# Patient Record
Sex: MALE
[De-identification: names, ages, dates, MRNs, and addresses within clinical notes are randomized per-mention and may not be internally consistent; named-entity substitution may affect disease eponyms.]

---

## 2017-04-22 ENCOUNTER — TRANSCRIPTION ENCOUNTER (OUTPATIENT)
Age: 67
End: 2017-04-22

## 2017-07-26 ENCOUNTER — APPOINTMENT (OUTPATIENT)
Dept: CT IMAGING | Facility: CLINIC | Age: 67
End: 2017-07-26

## 2017-07-26 ENCOUNTER — OUTPATIENT (OUTPATIENT)
Dept: OUTPATIENT SERVICES | Facility: HOSPITAL | Age: 67
LOS: 1 days | End: 2017-07-26
Payer: MEDICARE

## 2017-07-26 DIAGNOSIS — Z00.8 ENCOUNTER FOR OTHER GENERAL EXAMINATION: ICD-10-CM

## 2017-07-26 PROCEDURE — G0297: CPT

## 2018-07-30 ENCOUNTER — OUTPATIENT (OUTPATIENT)
Dept: OUTPATIENT SERVICES | Facility: HOSPITAL | Age: 68
LOS: 1 days | End: 2018-07-30
Payer: MEDICARE

## 2018-07-30 ENCOUNTER — APPOINTMENT (OUTPATIENT)
Dept: CT IMAGING | Facility: CLINIC | Age: 68
End: 2018-07-30

## 2018-07-30 DIAGNOSIS — Z00.8 ENCOUNTER FOR OTHER GENERAL EXAMINATION: ICD-10-CM

## 2018-07-30 PROCEDURE — G0297: CPT

## 2018-07-30 PROCEDURE — G0297: CPT | Mod: 26

## 2019-08-06 VITALS — BODY MASS INDEX: 36.1 KG/M2 | HEIGHT: 67 IN | WEIGHT: 230 LBS

## 2019-08-14 ENCOUNTER — APPOINTMENT (OUTPATIENT)
Dept: CT IMAGING | Facility: CLINIC | Age: 69
End: 2019-08-14
Payer: MEDICARE

## 2019-08-14 ENCOUNTER — OUTPATIENT (OUTPATIENT)
Dept: OUTPATIENT SERVICES | Facility: HOSPITAL | Age: 69
LOS: 1 days | End: 2019-08-14
Payer: MEDICARE

## 2019-08-14 DIAGNOSIS — Z00.8 ENCOUNTER FOR OTHER GENERAL EXAMINATION: ICD-10-CM

## 2019-08-14 PROCEDURE — G0297: CPT | Mod: 26

## 2019-08-14 PROCEDURE — G0297: CPT

## 2020-09-10 VITALS — HEIGHT: 67 IN | WEIGHT: 225 LBS | BODY MASS INDEX: 35.31 KG/M2

## 2020-09-10 NOTE — HISTORY OF PRESENT ILLNESS
[TextBox_13] : Referred by Dr. Seng Myles.\par \par Mr. WARE is a 70 year old male with a history of barretts esophagus, COPD and emphysema.\par \par He was called to review eligibility for Low-Dose CT lung cancer screening.  Reviewed and confirmed that the patient meets screening eligibility criteria:\par \par 70 years old \par \par Smoking Status: Former smoker\par \par Number of pack(s) per day: 2\par Number of years smoked: 41\par Number of pack years smokin\par \par Number of years since quitting smokin\par Quit year: \par \par Mr. WARE denies any symptoms of lung cancer, including new cough, change in cough, hemoptysis, and unintentional weight loss.\par \par Mr. WARE denies any personal history of lung cancer.  No lung cancer in a first degree relative.

## 2020-09-10 NOTE — PLAN
[FreeTextEntry1] : - Low Dose CT chest for lung cancer screening.\par \par - Encouraged continued smoking abstinence\par

## 2020-09-10 NOTE — DATA REVIEWED
[Lung Cancer Screening] : Patient underwent lung cancer screening [2] : 2 [TextBox_12] : 7/16 [TextBox_27] : 7/17 [TextBox_42] : 8/19

## 2020-09-14 ENCOUNTER — OUTPATIENT (OUTPATIENT)
Dept: OUTPATIENT SERVICES | Facility: HOSPITAL | Age: 70
LOS: 1 days | End: 2020-09-14
Payer: MEDICARE

## 2020-09-14 ENCOUNTER — APPOINTMENT (OUTPATIENT)
Dept: CT IMAGING | Facility: CLINIC | Age: 70
End: 2020-09-14
Payer: MEDICARE

## 2020-09-14 DIAGNOSIS — Z00.8 ENCOUNTER FOR OTHER GENERAL EXAMINATION: ICD-10-CM

## 2020-09-14 PROCEDURE — G0297: CPT

## 2020-09-14 PROCEDURE — G0297: CPT | Mod: 26

## 2021-06-02 ENCOUNTER — APPOINTMENT (OUTPATIENT)
Dept: ORTHOPEDIC SURGERY | Facility: CLINIC | Age: 71
End: 2021-06-02

## 2021-10-14 ENCOUNTER — RESULT REVIEW (OUTPATIENT)
Age: 71
End: 2021-10-14

## 2023-08-14 ENCOUNTER — NON-APPOINTMENT (OUTPATIENT)
Age: 73
End: 2023-08-14

## 2023-08-14 VITALS — HEIGHT: 67 IN | WEIGHT: 225 LBS | BODY MASS INDEX: 35.31 KG/M2

## 2023-08-14 NOTE — HISTORY OF PRESENT ILLNESS
[Former] : Former [TextBox_13] : Referred by Dr. Seng Myles. Mr. WARE is a 73 year old male with a history of barretts esophagus, COPD and emphysema. Reviewed and confirmed that the patient meets screening eligibility criteria: 73 years old Smoking Status: Former smoker Number of pack(s) per day: 2 Number of years smoked: 41 Number of pack years smokin Quit year:  No symptoms of lung cancer, including new cough, change in cough, hemoptysis, and unintentional weight loss. No personal history of lung cancer. No lung cancer in a first degree relative. [YearQuit] : 2008 [PacksperYear] : 82

## 2023-08-28 ENCOUNTER — OUTPATIENT (OUTPATIENT)
Dept: OUTPATIENT SERVICES | Facility: HOSPITAL | Age: 73
LOS: 1 days | End: 2023-08-28
Payer: MEDICARE

## 2023-08-28 ENCOUNTER — APPOINTMENT (OUTPATIENT)
Dept: CT IMAGING | Facility: CLINIC | Age: 73
End: 2023-08-28
Payer: MEDICARE

## 2023-08-28 DIAGNOSIS — Z00.8 ENCOUNTER FOR OTHER GENERAL EXAMINATION: ICD-10-CM

## 2023-08-28 PROCEDURE — 71271 CT THORAX LUNG CANCER SCR C-: CPT

## 2023-08-28 PROCEDURE — 71271 CT THORAX LUNG CANCER SCR C-: CPT | Mod: 26

## 2024-01-23 ENCOUNTER — APPOINTMENT (OUTPATIENT)
Dept: MRI IMAGING | Facility: CLINIC | Age: 74
End: 2024-01-23
Payer: MEDICARE

## 2024-01-23 PROCEDURE — 70551 MRI BRAIN STEM W/O DYE: CPT | Mod: MH

## 2024-02-05 ENCOUNTER — APPOINTMENT (OUTPATIENT)
Dept: MRI IMAGING | Facility: CLINIC | Age: 74
End: 2024-02-05

## 2024-02-09 ENCOUNTER — APPOINTMENT (OUTPATIENT)
Dept: MRI IMAGING | Facility: CLINIC | Age: 74
End: 2024-02-09
Payer: MEDICARE

## 2024-02-09 PROCEDURE — 70544 MR ANGIOGRAPHY HEAD W/O DYE: CPT | Mod: MH

## 2024-03-08 ENCOUNTER — OUTPATIENT (OUTPATIENT)
Dept: OUTPATIENT SERVICES | Facility: HOSPITAL | Age: 74
LOS: 1 days | End: 2024-03-08
Payer: MEDICARE

## 2024-03-08 ENCOUNTER — RESULT REVIEW (OUTPATIENT)
Age: 74
End: 2024-03-08

## 2024-03-08 ENCOUNTER — APPOINTMENT (OUTPATIENT)
Dept: CT IMAGING | Facility: CLINIC | Age: 74
End: 2024-03-08
Payer: MEDICARE

## 2024-03-08 DIAGNOSIS — R10.30 LOWER ABDOMINAL PAIN, UNSPECIFIED: ICD-10-CM

## 2024-03-08 PROCEDURE — 74177 CT ABD & PELVIS W/CONTRAST: CPT | Mod: MH

## 2024-03-08 PROCEDURE — 74177 CT ABD & PELVIS W/CONTRAST: CPT | Mod: 26,MH

## 2024-03-12 ENCOUNTER — OUTPATIENT (OUTPATIENT)
Dept: OUTPATIENT SERVICES | Facility: HOSPITAL | Age: 74
LOS: 1 days | Discharge: HOME | End: 2024-03-12

## 2024-03-12 DIAGNOSIS — R59.0 LOCALIZED ENLARGED LYMPH NODES: ICD-10-CM

## 2024-03-14 ENCOUNTER — RESULT REVIEW (OUTPATIENT)
Age: 74
End: 2024-03-14

## 2024-03-14 ENCOUNTER — APPOINTMENT (OUTPATIENT)
Dept: HEMATOLOGY ONCOLOGY | Facility: CLINIC | Age: 74
End: 2024-03-14
Payer: MEDICARE

## 2024-03-14 ENCOUNTER — NON-APPOINTMENT (OUTPATIENT)
Age: 74
End: 2024-03-14

## 2024-03-14 VITALS
SYSTOLIC BLOOD PRESSURE: 137 MMHG | OXYGEN SATURATION: 95 % | TEMPERATURE: 98.2 F | HEART RATE: 77 BPM | HEIGHT: 67 IN | WEIGHT: 258.67 LBS | BODY MASS INDEX: 40.6 KG/M2 | DIASTOLIC BLOOD PRESSURE: 88 MMHG | RESPIRATION RATE: 16 BRPM

## 2024-03-14 DIAGNOSIS — J44.9 CHRONIC OBSTRUCTIVE PULMONARY DISEASE, UNSPECIFIED: ICD-10-CM

## 2024-03-14 DIAGNOSIS — I10 ESSENTIAL (PRIMARY) HYPERTENSION: ICD-10-CM

## 2024-03-14 DIAGNOSIS — K57.90 DIVERTICULOSIS OF INTESTINE, PART UNSPECIFIED, W/OUT PERFORATION OR ABSCESS W/OUT BLEEDING: ICD-10-CM

## 2024-03-14 DIAGNOSIS — Z78.9 OTHER SPECIFIED HEALTH STATUS: ICD-10-CM

## 2024-03-14 DIAGNOSIS — E78.00 PURE HYPERCHOLESTEROLEMIA, UNSPECIFIED: ICD-10-CM

## 2024-03-14 DIAGNOSIS — I73.9 PERIPHERAL VASCULAR DISEASE, UNSPECIFIED: ICD-10-CM

## 2024-03-14 DIAGNOSIS — N40.0 BENIGN PROSTATIC HYPERPLASIA WITHOUT LOWER URINARY TRACT SYMPMS: ICD-10-CM

## 2024-03-14 DIAGNOSIS — R59.0 LOCALIZED ENLARGED LYMPH NODES: ICD-10-CM

## 2024-03-14 DIAGNOSIS — Z80.8 FAMILY HISTORY OF MALIGNANT NEOPLASM OF OTHER ORGANS OR SYSTEMS: ICD-10-CM

## 2024-03-14 DIAGNOSIS — K22.70 BARRETT'S ESOPHAGUS W/OUT DYSPLASIA: ICD-10-CM

## 2024-03-14 DIAGNOSIS — Z80.9 FAMILY HISTORY OF MALIGNANT NEOPLASM, UNSPECIFIED: ICD-10-CM

## 2024-03-14 DIAGNOSIS — Z87.891 PERSONAL HISTORY OF NICOTINE DEPENDENCE: ICD-10-CM

## 2024-03-14 LAB
ALBUMIN SERPL ELPH-MCNC: 4.2 G/DL
ALP BLD-CCNC: 55 U/L
ALT SERPL-CCNC: 35 U/L
ANION GAP SERPL CALC-SCNC: 15 MMOL/L
APTT BLD: 32.7 SEC
AST SERPL-CCNC: 33 U/L
BASOPHILS # BLD AUTO: 0.09 K/UL — SIGNIFICANT CHANGE UP (ref 0–0.2)
BASOPHILS NFR BLD AUTO: 0.8 % — SIGNIFICANT CHANGE UP (ref 0–2)
BILIRUB SERPL-MCNC: 0.5 MG/DL
BUN SERPL-MCNC: 17 MG/DL
CALCIUM SERPL-MCNC: 9.6 MG/DL
CHLORIDE SERPL-SCNC: 100 MMOL/L
CO2 SERPL-SCNC: 26 MMOL/L
CREAT SERPL-MCNC: 1.16 MG/DL
EGFR: 67 ML/MIN/1.73M2
EOSINOPHIL # BLD AUTO: 0.57 K/UL — HIGH (ref 0–0.5)
EOSINOPHIL NFR BLD AUTO: 5.3 % — SIGNIFICANT CHANGE UP (ref 0–6)
GLUCOSE SERPL-MCNC: 104 MG/DL
HCT VFR BLD CALC: 46.9 % — SIGNIFICANT CHANGE UP (ref 39–50)
HGB BLD-MCNC: 15.6 G/DL — SIGNIFICANT CHANGE UP (ref 13–17)
IMM GRANULOCYTES NFR BLD AUTO: 0.3 % — SIGNIFICANT CHANGE UP (ref 0–0.9)
INR PPP: 1.1 RATIO
LYMPHOCYTES # BLD AUTO: 3.43 K/UL — HIGH (ref 1–3.3)
LYMPHOCYTES # BLD AUTO: 31.6 % — SIGNIFICANT CHANGE UP (ref 13–44)
MCHC RBC-ENTMCNC: 31.6 PG — SIGNIFICANT CHANGE UP (ref 27–34)
MCHC RBC-ENTMCNC: 33.3 G/DL — SIGNIFICANT CHANGE UP (ref 32–36)
MCV RBC AUTO: 95.1 FL — SIGNIFICANT CHANGE UP (ref 80–100)
MONOCYTES # BLD AUTO: 1.27 K/UL — HIGH (ref 0–0.9)
MONOCYTES NFR BLD AUTO: 11.7 % — SIGNIFICANT CHANGE UP (ref 2–14)
NEUTROPHILS # BLD AUTO: 5.45 K/UL — SIGNIFICANT CHANGE UP (ref 1.8–7.4)
NEUTROPHILS NFR BLD AUTO: 50.3 % — SIGNIFICANT CHANGE UP (ref 43–77)
NRBC # BLD: 0 /100 WBCS — SIGNIFICANT CHANGE UP (ref 0–0)
PLATELET # BLD AUTO: 273 K/UL — SIGNIFICANT CHANGE UP (ref 150–400)
POTASSIUM SERPL-SCNC: 4.7 MMOL/L
PROT SERPL-MCNC: 6.9 G/DL
PSA FREE FLD-MCNC: 38 %
PSA FREE SERPL-MCNC: 0.45 NG/ML
PSA SERPL-MCNC: 1.19 NG/ML
PT BLD: 12.4 SEC
RBC # BLD: 4.93 M/UL — SIGNIFICANT CHANGE UP (ref 4.2–5.8)
RBC # FLD: 13.6 % — SIGNIFICANT CHANGE UP (ref 10.3–14.5)
SODIUM SERPL-SCNC: 140 MMOL/L
WBC # BLD: 10.84 K/UL — HIGH (ref 3.8–10.5)
WBC # FLD AUTO: 10.84 K/UL — HIGH (ref 3.8–10.5)

## 2024-03-14 PROCEDURE — 99205 OFFICE O/P NEW HI 60 MIN: CPT

## 2024-03-14 RX ORDER — TAMSULOSIN HYDROCHLORIDE 0.4 MG/1
0.4 CAPSULE ORAL
Qty: 30 | Refills: 5 | Status: ACTIVE | COMMUNITY
Start: 2024-03-14

## 2024-03-14 RX ORDER — ESOMEPRAZOLE MAGNESIUM 20 MG/1
20 CAPSULE, DELAYED RELEASE ORAL DAILY
Refills: 0 | Status: ACTIVE | COMMUNITY
Start: 2024-03-14

## 2024-03-14 RX ORDER — METRONIDAZOLE 500 MG/1
500 TABLET ORAL TWICE DAILY
Qty: 28 | Refills: 0 | Status: ACTIVE | COMMUNITY
Start: 2024-03-14

## 2024-03-14 RX ORDER — BUDESONIDE, GLYCOPYRROLATE, AND FORMOTEROL FUMARATE 160; 9; 4.8 UG/1; UG/1; UG/1
160-9-4.8 AEROSOL, METERED RESPIRATORY (INHALATION) DAILY
Refills: 0 | Status: ACTIVE | COMMUNITY
Start: 2024-03-14

## 2024-03-14 RX ORDER — ATORVASTATIN CALCIUM 10 MG/1
10 TABLET, FILM COATED ORAL
Refills: 0 | Status: ACTIVE | COMMUNITY
Start: 2024-03-14

## 2024-03-14 RX ORDER — CIPROFLOXACIN HYDROCHLORIDE 500 MG/1
500 TABLET, FILM COATED ORAL DAILY
Qty: 7 | Refills: 0 | Status: ACTIVE | COMMUNITY
Start: 2024-03-14

## 2024-03-14 RX ORDER — HYDROCHLOROTHIAZIDE 25 MG/1
25 TABLET ORAL
Refills: 0 | Status: ACTIVE | COMMUNITY
Start: 2024-03-14

## 2024-03-14 NOTE — ASSESSMENT
[Palliative Care Plan] : not applicable at this time [Supportive] : Goals of care discussed with patient: Supportive [FreeTextEntry1] : FAYE Gupta is a 73-year-old male referred by physician assistant to Dr Eduardo Allen after CT scan finding reported an enlarged left iliac chain lymph node and mild enlargement of at least two smaller para-aortic lymph nodes. The CT imaging physician wrote a recommendation for oncology evaluation. The patient had a long history of bilateral lower extremity arterial insufficiency and chronic (at least two years) cellulitis with ulceration in bilateral pretibial region. He does not have fever, chills or weight loss at this time. The physical examination shows no peripheral palpable lymphadenopathy. He has no palpable liver or spleen, and the size of these organs are normal on CT scan. CT scan does show an enlarged prostate and he has seen  every 6 months with normal PSA and normal pelvic US (of bladder) We discussed the use of diet and dietary therapy in the management of Crohn's Disease. In order to safely and effectively implement the dietary intervention, follow up with our dietician team was recommended. By his history, a lymphoma diagnosis is unlikely although low grade lymphoma might present in this age group. Lymphadenopathy in this region may exist with prostate disease, rectal tumors (not seen on imaging or on his rectal examinations) skin carcinoma pf lower extremity-not seen on today's examination; nor is there a history; and chronic cellulitis,  The chronic cellulitis is the likely diagnosis of a cause of the lymphadenopathy. I will ask for a US guided biopsy of the iliac lymph node. Blood testing requested today (no [facility] blood testing in [employer]) CBC CMP aPTT and PT. I shall request US guided biopsy RTC in one month to see Cherelle RAMIREZ

## 2024-03-14 NOTE — REASON FOR VISIT
[Initial Consultation] : an initial consultation for [Blood Count Assessment] : blood count assessment [Other: _____] : [unfilled] [Spouse] : spouse [FreeTextEntry2] : lymphadenopathy

## 2024-03-14 NOTE — PHYSICAL EXAM
[Restricted in physically strenuous activity but ambulatory and able to carry out work of a light or sedentary nature] : Status 1- Restricted in physically strenuous activity but ambulatory and able to carry out work of a light or sedentary nature, e.g., light house work, office work [Obese] : obese [Normal] : affect appropriate [de-identified] : edentulous [de-identified] : small catarracts [de-identified] : no palpable cervical supraclavicular axillary or inguinal lymph nodes [de-identified] : obese; liver edge non palpable; normal liver dullness and no palpable spleen.

## 2024-03-14 NOTE — HISTORY OF PRESENT ILLNESS
[90: Able to carry normal activity; minor signs or symptoms of disease.] : 90: Able to carry normal activity; minor signs or symptoms of disease.  [ECOG Performance Status: 1 - Restricted in physically strenuous activity but ambulatory and able to carry out work of a light or sedentary nature] : Performance Status: 1 - Restricted in physically strenuous activity but ambulatory and able to carry out work of a light or sedentary nature, e.g., light house work, office work [Disease:__________________________] : Disease: [unfilled] [Date: ____________] : Patient's last distress assessment performed on [unfilled]. [5 - Distress Level] : Distress Level: 5 [de-identified] : Bobby Ashley is a 73-year-old male with a recent CT scan performed and resulted to show mild lymphadenopathy in the paraaortic and left iliac region. He has a 3-year history of hypertension chronic bilateral lower extremity arterial insufficiency and chronic bilateral leg ulceration.  He was treated at the wound care center. Dr Eduardo Allen gastroenterology addresses endoscopy for Hemanth. ASHLEY Ellis: the patient had a history of hepatitis at age 21. PCP Payton Hawthorne for general physical needs Dr SRIKANTH Bonilla ([facility]; [facility] [place]) treating hypertension and cholesterol since 2018 with associated increase in weight. He is not on anticoagulation. Dr ANABEL Yusuf urology: follow enlarged prostate. He believes that he has a normal PSA; no prostate biopsy; seen every six months since 2014; he receives US of Bladder last US in July 2023. Dr [facility] [place] bilateral lower extremity under treatment from 202o though. 2023; poor circulation. He was not a candidate vein surgery. Three years of antibiotic therapy.  Dr GEORGE Myles pulmonary ([facility]) treats for COPD. There is no history of tuberculosis. There is no history of cancer, no history no chemotherapy or radiation therapy. [de-identified] : see HPI

## 2024-03-14 NOTE — REVIEW OF SYSTEMS
[Wheezing] : wheezing [Shortness Of Breath] : shortness of breath [Cough] : cough [SOB on Exertion] : shortness of breath during exertion [Vision Problems] : vision problems [Diarrhea: Grade 0] : Diarrhea: Grade 0 [Joint Pain] : joint pain [Joint Stiffness] : joint stiffness [Skin Wound] : skin wound [Anxiety] : anxiety [Negative] : Allergic/Immunologic [Chills] : no chills [Fever] : no fever [Fatigue] : no fatigue [Night Sweats] : no night sweats [Eye Pain] : no eye pain [Recent Change In Weight] : ~T no recent weight change [Red Eyes] : eyes not red [Abdominal Pain] : no abdominal pain [Dry Eyes] : no dryness of the eyes [Constipation] : no constipation [Vomiting] : no vomiting [Dysuria] : no dysuria [Incontinence] : no incontinence [Muscle Pain] : no muscle pain [Fainting] : no fainting [Dizziness] : no dizziness [Confused] : no confusion [Difficulty Walking] : no difficulty walking [Suicidal] : not suicidal [Insomnia] : no insomnia [Depression] : no depression [Hot Flashes] : no hot flashes [Proptosis] : no proptosis [Muscle Weakness] : no muscle weakness [Deepening Of The Voice] : no deepening of the voice [Easy Bruising] : no tendency for easy bruising [Easy Bleeding] : no tendency for easy bleeding [FreeTextEntry2] : long term obesity limiting activity [FreeTextEntry4] : full dental extractions for gingivitis [FreeTextEntry6] : COPD 2014 [FreeTextEntry8] : decreased urine stream [de-identified] : healed bilateral lower extremity healed ulcers

## 2024-03-15 ENCOUNTER — APPOINTMENT (OUTPATIENT)
Dept: MRI IMAGING | Facility: CLINIC | Age: 74
End: 2024-03-15

## 2024-04-17 ENCOUNTER — APPOINTMENT (OUTPATIENT)
Dept: HEMATOLOGY ONCOLOGY | Facility: CLINIC | Age: 74
End: 2024-04-17

## 2024-05-08 ENCOUNTER — APPOINTMENT (OUTPATIENT)
Dept: ULTRASOUND IMAGING | Facility: IMAGING CENTER | Age: 74
End: 2024-05-08

## 2024-06-06 ENCOUNTER — RESULT REVIEW (OUTPATIENT)
Age: 74
End: 2024-06-06

## 2024-08-19 ENCOUNTER — NON-APPOINTMENT (OUTPATIENT)
Age: 74
End: 2024-08-19

## 2024-08-20 VITALS — WEIGHT: 258 LBS | BODY MASS INDEX: 40.49 KG/M2 | HEIGHT: 67 IN

## 2024-08-20 DIAGNOSIS — Z87.891 PERSONAL HISTORY OF NICOTINE DEPENDENCE: ICD-10-CM

## 2024-08-20 NOTE — HISTORY OF PRESENT ILLNESS
[Former] : Former [TextBox_13] : Referred by Dr. Seng Myles. Mr. WARE is a 74 year old male with a history of barretts esophagus, COPD and emphysema. Reviewed and confirmed that the patient meets screening eligibility criteria: 74 years old Smoking Status: Former smoker Number of pack(s) per day: 2 Number of years smoked: 41 Number of pack years smokin Quit year:  No symptoms of lung cancer, including new cough, change in cough, hemoptysis, and unintentional weight loss. No personal history of lung cancer. No lung cancer in a first degree relative. [YearQuit] : 2011 [PacksperYear] : 82

## 2024-09-04 ENCOUNTER — OUTPATIENT (OUTPATIENT)
Dept: OUTPATIENT SERVICES | Facility: HOSPITAL | Age: 74
LOS: 1 days | End: 2024-09-04
Payer: MEDICARE

## 2024-09-04 ENCOUNTER — APPOINTMENT (OUTPATIENT)
Dept: CT IMAGING | Facility: CLINIC | Age: 74
End: 2024-09-04

## 2024-09-04 DIAGNOSIS — R91.1 SOLITARY PULMONARY NODULE: ICD-10-CM

## 2024-09-04 PROCEDURE — 71271 CT THORAX LUNG CANCER SCR C-: CPT

## 2024-09-04 PROCEDURE — 71271 CT THORAX LUNG CANCER SCR C-: CPT | Mod: 26
